# Patient Record
Sex: MALE | Race: BLACK OR AFRICAN AMERICAN | NOT HISPANIC OR LATINO | URBAN - METROPOLITAN AREA
[De-identification: names, ages, dates, MRNs, and addresses within clinical notes are randomized per-mention and may not be internally consistent; named-entity substitution may affect disease eponyms.]

---

## 2023-11-30 ENCOUNTER — OUTPATIENT (OUTPATIENT)
Dept: OUTPATIENT SERVICES | Facility: HOSPITAL | Age: 78
LOS: 1 days | Discharge: HOME | End: 2023-11-30
Payer: MEDICARE

## 2023-11-30 PROCEDURE — 88305 TISSUE EXAM BY PATHOLOGIST: CPT | Mod: 26

## 2023-11-30 PROCEDURE — 43239 EGD BIOPSY SINGLE/MULTIPLE: CPT

## 2023-11-30 PROCEDURE — C1889: CPT

## 2023-11-30 PROCEDURE — 88305 TISSUE EXAM BY PATHOLOGIST: CPT

## 2023-11-30 DEVICE — CLIP RESOLUTION 360 ULTRA 235CM 20/BX: Type: IMPLANTABLE DEVICE | Status: FUNCTIONAL

## 2023-11-30 NOTE — PRE-ANESTHESIA EVALUATION ADULT - NSANTHPEFT_GEN_ALL_CORE
AAOx3 in NAD  NSR, S1, S2 heard, no murmurs, gallops, or rubs auscultated  Lungs clear to auscultation bilaterally

## 2023-12-01 LAB
SURGICAL PATHOLOGY STUDY: SIGNIFICANT CHANGE UP
SURGICAL PATHOLOGY STUDY: SIGNIFICANT CHANGE UP

## 2025-08-15 ENCOUNTER — OUTPATIENT (OUTPATIENT)
Dept: OUTPATIENT SERVICES | Facility: HOSPITAL | Age: 80
LOS: 1 days | Discharge: HOME | End: 2025-08-15
Payer: MEDICARE

## 2025-08-15 VITALS
RESPIRATION RATE: 18 BRPM | HEART RATE: 95 BPM | SYSTOLIC BLOOD PRESSURE: 186 MMHG | OXYGEN SATURATION: 97 % | HEIGHT: 70 IN | TEMPERATURE: 98 F | WEIGHT: 125 LBS | DIASTOLIC BLOOD PRESSURE: 84 MMHG

## 2025-08-15 PROCEDURE — 45381 COLONOSCOPY SUBMUCOUS NJX: CPT | Mod: PT

## 2025-08-15 PROCEDURE — 88305 TISSUE EXAM BY PATHOLOGIST: CPT | Mod: 26

## 2025-08-15 PROCEDURE — 43235 EGD DIAGNOSTIC BRUSH WASH: CPT

## 2025-08-15 PROCEDURE — 88305 TISSUE EXAM BY PATHOLOGIST: CPT

## 2025-08-15 PROCEDURE — 45380 COLONOSCOPY AND BIOPSY: CPT | Mod: PT,XS

## 2025-08-15 PROCEDURE — 45385 COLONOSCOPY W/LESION REMOVAL: CPT | Mod: PT

## (undated) DEVICE — SNARE LESIONHUNTER ROTAT NITNL COLD 10MM

## (undated) DEVICE — FORCEP RADIAL JAW 4 W NDL 2.2MM 2.8MM 240CM ORANGE DISP

## (undated) DEVICE — Device